# Patient Record
Sex: MALE | Race: OTHER | NOT HISPANIC OR LATINO | Employment: FULL TIME | URBAN - METROPOLITAN AREA
[De-identification: names, ages, dates, MRNs, and addresses within clinical notes are randomized per-mention and may not be internally consistent; named-entity substitution may affect disease eponyms.]

---

## 2023-09-30 ENCOUNTER — OFFICE VISIT (OUTPATIENT)
Dept: URGENT CARE | Facility: CLINIC | Age: 28
End: 2023-09-30
Payer: COMMERCIAL

## 2023-09-30 VITALS
RESPIRATION RATE: 18 BRPM | DIASTOLIC BLOOD PRESSURE: 85 MMHG | SYSTOLIC BLOOD PRESSURE: 128 MMHG | HEART RATE: 78 BPM | OXYGEN SATURATION: 98 % | TEMPERATURE: 98.1 F

## 2023-09-30 DIAGNOSIS — U07.1 COVID: Primary | ICD-10-CM

## 2023-09-30 LAB — POC SARS-COV-2 AG: ABNORMAL

## 2023-09-30 PROCEDURE — 99203 OFFICE O/P NEW LOW 30 MIN: CPT | Performed by: PHYSICIAN ASSISTANT

## 2023-09-30 ASSESSMENT — ENCOUNTER SYMPTOMS
FATIGUE: 1
FEVER: 1
ALLERGIC/IMMUNOLOGIC NEGATIVE: 1
SORE THROAT: 1
SHORTNESS OF BREATH: 0
ABDOMINAL PAIN: 0
COUGH: 1
NEUROLOGICAL NEGATIVE: 1
RHINORRHEA: 0
ENDOCRINE NEGATIVE: 1
APPETITE CHANGE: 1
FLANK PAIN: 0
COLOR CHANGE: 0
BACK PAIN: 0
WHEEZING: 0
CHILLS: 1
SINUS PRESSURE: 1
DIARRHEA: 0
WOUND: 0
BLOOD IN STOOL: 0
EYE REDNESS: 0
PSYCHIATRIC NEGATIVE: 1
EYE PAIN: 0
DYSURIA: 0
NAUSEA: 0
ARTHRALGIAS: 0
VOMITING: 0
ACTIVITY CHANGE: 0
HEMATOLOGIC/LYMPHATIC NEGATIVE: 1
EYE DISCHARGE: 0

## 2023-09-30 NOTE — PROGRESS NOTES
Subjective   Patient ID: Akash Angela is a 27 y.o. male who presents for Chills and Generalized Body Aches (x3 days).    History provided by:  Patient   used: No      Pt here with myalgia, malaise, cough, congestion for the last 3 days  Used OTC medications with mild relief  Patient is in no destress at time of exam.     Review of Systems   Constitutional:  Positive for appetite change, chills, fatigue and fever. Negative for activity change.   HENT:  Positive for congestion, sinus pressure and sore throat. Negative for rhinorrhea.    Eyes:  Negative for pain, discharge and redness.   Respiratory:  Positive for cough. Negative for shortness of breath and wheezing.    Cardiovascular:  Negative for chest pain and leg swelling.   Gastrointestinal:  Negative for abdominal pain, blood in stool, diarrhea, nausea and vomiting.   Endocrine: Negative.    Genitourinary:  Negative for dysuria and flank pain.   Musculoskeletal:  Negative for arthralgias, back pain and gait problem.   Skin:  Negative for color change, rash and wound.   Allergic/Immunologic: Negative.    Neurological: Negative.    Hematological: Negative.    Psychiatric/Behavioral: Negative.         Objective   Physical Exam  Vitals reviewed.   Constitutional:       General: He is not in acute distress.     Appearance: Normal appearance. He is not toxic-appearing.   HENT:      Head: Normocephalic.      Right Ear: Tympanic membrane and ear canal normal. No tenderness. No mastoid tenderness.      Left Ear: Tympanic membrane and ear canal normal. No tenderness. No mastoid tenderness.      Nose: Congestion and rhinorrhea present.      Mouth/Throat:      Mouth: Mucous membranes are moist.      Pharynx: Oropharynx is clear. Posterior oropharyngeal erythema present.   Eyes:      Conjunctiva/sclera: Conjunctivae normal.      Pupils: Pupils are equal, round, and reactive to light.   Cardiovascular:      Rate and Rhythm: Normal rate and regular  rhythm.      Heart sounds: No murmur heard.  Pulmonary:      Effort: No respiratory distress.      Breath sounds: No stridor. No wheezing, rhonchi or rales.   Chest:      Chest wall: No tenderness.   Abdominal:      Tenderness: There is no abdominal tenderness. There is no guarding.   Musculoskeletal:         General: Normal range of motion.   Skin:     General: Skin is warm and dry.      Capillary Refill: Capillary refill takes less than 2 seconds.      Findings: No erythema.   Neurological:      General: No focal deficit present.      Mental Status: He is alert.         Assessment/Plan   Problem List Items Addressed This Visit       COVID - Primary     Pt is otherwise healthy   No risks for significant disease from COVID infection  I discussed OTC medication use with the patient, rest, hydration, quarantine per CDC guidlines         Relevant Orders    POCT NEETA Vasquez COVID-19 AG

## 2023-09-30 NOTE — ASSESSMENT & PLAN NOTE
Pt is otherwise healthy   No risks for significant disease from COVID infection  I discussed OTC medication use with the patient, rest, hydration, quarantine per CDC guidlines

## 2024-05-23 ENCOUNTER — OFFICE VISIT (OUTPATIENT)
Dept: URGENT CARE | Facility: CLINIC | Age: 29
End: 2024-05-23
Payer: OTHER GOVERNMENT

## 2024-05-23 VITALS
OXYGEN SATURATION: 98 % | RESPIRATION RATE: 18 BRPM | HEART RATE: 65 BPM | DIASTOLIC BLOOD PRESSURE: 74 MMHG | SYSTOLIC BLOOD PRESSURE: 119 MMHG | TEMPERATURE: 98.7 F | WEIGHT: 120 LBS

## 2024-05-23 DIAGNOSIS — J02.9 PHARYNGITIS, UNSPECIFIED ETIOLOGY: Primary | ICD-10-CM

## 2024-05-23 PROCEDURE — 99213 OFFICE O/P EST LOW 20 MIN: CPT

## 2024-05-23 PROCEDURE — 1036F TOBACCO NON-USER: CPT

## 2024-05-23 ASSESSMENT — ENCOUNTER SYMPTOMS
COUGH: 0
VOMITING: 0
TROUBLE SWALLOWING: 0
SHORTNESS OF BREATH: 0
CHEST TIGHTNESS: 0
MYALGIAS: 0
DIARRHEA: 0
FATIGUE: 0
ABDOMINAL PAIN: 0
SORE THROAT: 1
CHILLS: 0
NAUSEA: 0
RHINORRHEA: 0
FEVER: 0
HEADACHES: 0

## 2024-05-23 ASSESSMENT — PAIN SCALES - GENERAL: PAINLEVEL: 4

## 2024-05-23 NOTE — PROGRESS NOTES
Subjective   Patient ID: Akash Angela is a 28 y.o. male.        Patient presents to the urgent care for complaints of sore throat and bumps on the back of his tongue for the last month.  Patient states that the pain in his throat seems to be more down towards his Ralph's apple and is a constant.  Patient states that he has noticed more that it does not matter what he eats he will develop small canker sores however those resolve within a day or so.  Patient states that the sore throat is a constant pain.  Patient denies any difficulty swallowing.  Patient states that about a month ago he recently switched over from propranolol to metoprolol and is unsure if that is what is causing the throat pain and bumps.  Patient denies any runny nose, congestion, fever, chills, abdominal pain, chest pain, difficulty breathing or any other systemic complaints.    Review of Systems   Constitutional:  Negative for chills, fatigue and fever.   HENT:  Positive for sore throat. Negative for congestion, ear pain, postnasal drip, rhinorrhea and trouble swallowing.    Respiratory:  Negative for cough, chest tightness and shortness of breath.    Cardiovascular:  Negative for chest pain.   Gastrointestinal:  Negative for abdominal pain, diarrhea, nausea and vomiting.   Musculoskeletal:  Negative for myalgias.   Skin:  Negative for rash.   Neurological:  Negative for headaches.     Objective   Physical Exam  Constitutional:       Appearance: Normal appearance.   HENT:      Head: Normocephalic and atraumatic.      Nose: Nose normal.      Mouth/Throat:      Mouth: Mucous membranes are moist.      Tongue: No lesions.      Palate: No lesions.      Pharynx: Oropharynx is clear. No oropharyngeal exudate or posterior oropharyngeal erythema.   Eyes:      Extraocular Movements: Extraocular movements intact.      Conjunctiva/sclera: Conjunctivae normal.      Pupils: Pupils are equal, round, and reactive to light.   Cardiovascular:      Rate and  Rhythm: Normal rate and regular rhythm.      Pulses: Normal pulses.      Heart sounds: Normal heart sounds.   Pulmonary:      Effort: Pulmonary effort is normal.      Breath sounds: Normal breath sounds.   Abdominal:      General: Abdomen is flat. Bowel sounds are normal.      Palpations: Abdomen is soft.   Musculoskeletal:         General: Normal range of motion.      Cervical back: Normal range of motion and neck supple.   Skin:     General: Skin is warm and dry.      Capillary Refill: Capillary refill takes less than 2 seconds.   Neurological:      General: No focal deficit present.      Mental Status: He is alert and oriented to person, place, and time.           I discussed with patient that I do not feel that this is strep as there is no erythema, enlarged tonsils present on exam so this is highly unlikely.  No signs of thrush was noted on exam.  I discussed with patient that it is best that he follow-up with ENT for further workup to determine what the next best course of action would be.    Assessment/Plan   Problem List Items Addressed This Visit             ICD-10-CM    Pharyngitis - Primary J02.9    Relevant Orders    Referral to ENT       Patient disposition: Home